# Patient Record
Sex: MALE | Race: BLACK OR AFRICAN AMERICAN | NOT HISPANIC OR LATINO
[De-identification: names, ages, dates, MRNs, and addresses within clinical notes are randomized per-mention and may not be internally consistent; named-entity substitution may affect disease eponyms.]

---

## 2019-01-29 ENCOUNTER — APPOINTMENT (OUTPATIENT)
Dept: ORTHOPEDIC SURGERY | Facility: CLINIC | Age: 59
End: 2019-01-29
Payer: COMMERCIAL

## 2019-01-29 DIAGNOSIS — S93.401A SPRAIN OF UNSPECIFIED LIGAMENT OF RIGHT ANKLE, INITIAL ENCOUNTER: ICD-10-CM

## 2019-01-29 PROCEDURE — 99214 OFFICE O/P EST MOD 30 MIN: CPT

## 2019-01-29 NOTE — HISTORY OF PRESENT ILLNESS
[de-identified] : 4yo male presents complaining of right ankle pain for 3 months. No injuries reported. A localizing superior lateral malleolus area. He feels a sharp shooting sensation. Worse with twisting ankle and ambulation. He saw his PMD who thought maybe it was gout placed him on medication. This did not help. He does not like taking medicines. Denies numbness tingling \par \par The patient's past medical history, past surgical history, medications, allergies, and social history were reviewed by me today with the patient and documented accordingly. In addition, the patient's family history, which is noncontributory to this visit, was also reviewed.\par

## 2019-01-29 NOTE — PHYSICAL EXAM
[de-identified] : General Exam\par \par Well developed, well nourished\par No apparent distress\par Oriented to person, place, and time\par Mood: Normal\par Affect: Normal\par Balance and coordination: Normal\par Gait: Normal\par \par Right ankle exam\par \par Skin: Clean, dry, intact\par Inspection: No obvious malalignment, no swelling, no effusion\par Tenderness: No tenderness over the lateral malleolus, medial malleolus, + ttp CFL/ATFL no ttp PTFL, deltoid ligament. No tenderness proximal fibula. No tenderness about heel, no pain with heel squeeze.\par ROM:dorsi flexor tendon degrees plantar flexion 40° normal subtalar motion. \par Stability: Negative anterior/posterior drawer.\par Additional tests: Negative Mortons compression test, Negative syndesmosis squeeze test.\par Strength: 5/5 TA/GS/EHL\par Sensation: Intact to light touch in dp/sp/tib/juan j/saph distributions\par Pulses: 2+ DP/PT pulses\par  [de-identified] : X-rays of the right ankle and outside were reviewed. 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 There is no fracture or dislocation. There are no loose bodies.

## 2019-01-29 NOTE — DISCUSSION/SUMMARY
[de-identified] : 58-year-old male right ankle impingement. This diagnostic options including MRI did not physically medications physical therapy injections. He requested an anti-inflammatory medication. We discussed that he retry this treatment course and he stated that he felt better we'll do it at another trial of anti-inflammatory medications. Given prescription for Mobic to take with food stop stomach upset. If not improved we'll obtain an MRI. All questions answered

## 2019-02-07 ENCOUNTER — APPOINTMENT (OUTPATIENT)
Dept: SURGERY | Facility: CLINIC | Age: 59
End: 2019-02-07
Payer: COMMERCIAL

## 2019-02-07 VITALS
BODY MASS INDEX: 23.8 KG/M2 | TEMPERATURE: 98.4 F | HEIGHT: 71 IN | HEART RATE: 68 BPM | WEIGHT: 170 LBS | DIASTOLIC BLOOD PRESSURE: 82 MMHG | SYSTOLIC BLOOD PRESSURE: 135 MMHG | OXYGEN SATURATION: 98 % | RESPIRATION RATE: 15 BRPM

## 2019-02-07 DIAGNOSIS — I10 ESSENTIAL (PRIMARY) HYPERTENSION: ICD-10-CM

## 2019-02-07 PROCEDURE — 99244 OFF/OP CNSLTJ NEW/EST MOD 40: CPT

## 2019-02-07 RX ORDER — PREDNISONE 20 MG/1
20 TABLET ORAL
Qty: 18 | Refills: 0 | Status: DISCONTINUED | COMMUNITY
Start: 2018-12-17

## 2019-02-07 RX ORDER — MELOXICAM 15 MG/1
15 TABLET ORAL
Qty: 60 | Refills: 2 | Status: DISCONTINUED | COMMUNITY
Start: 2019-01-29 | End: 2019-02-07

## 2019-02-07 RX ORDER — TRIAMCINOLONE ACETONIDE 55 UG/1
55 SOLUTION/ DROPS OPHTHALMIC
Qty: 169 | Refills: 0 | Status: DISCONTINUED | COMMUNITY
Start: 2018-03-07

## 2019-02-07 RX ORDER — INDOMETHACIN 50 MG/1
50 CAPSULE ORAL
Qty: 10 | Refills: 0 | Status: DISCONTINUED | COMMUNITY
Start: 2018-12-10

## 2019-02-07 RX ORDER — ALLOPURINOL 100 MG/1
100 TABLET ORAL
Qty: 30 | Refills: 0 | Status: DISCONTINUED | COMMUNITY
Start: 2018-11-19

## 2019-02-07 RX ORDER — ASCORBIC ACID 500 MG
TABLET ORAL
Refills: 0 | Status: ACTIVE | COMMUNITY

## 2019-02-07 NOTE — HISTORY OF PRESENT ILLNESS
[de-identified] : This 58-year-old gentleman complains of a bulge in the left groin that has been present for at least 10 years. He feels that it has gotten bigger over the last several years. He experiences moderate discomfort when he eats 2 much he has been on his feet for a long time.  He denies any change in bowel or urinary habits

## 2019-02-07 NOTE — PHYSICAL EXAM
[Normal Breath Sounds] : Normal breath sounds [Normal Heart Sounds] : normal heart sounds [Calm] : calm [de-identified] : normal [de-identified] : normal [de-identified] : normal [de-identified] : Soft and nontender. There is an easily reducible left inguinal hernia. No hernias are palpated on the right [de-identified] : Penis and testicles normal [de-identified] : normal [de-identified] : normal [de-identified] : normal

## 2019-02-07 NOTE — PLAN
[FreeTextEntry1] : This patient suffered from a left inguinal hernia. I have offered him repair.The procedure as well as risks(including, but not limited to bleeding, infection, injury to hollow viscus, chronic pain and testicular atrophy), benefits (pain relief), and alternatives were explained to the patient.

## 2019-02-14 ENCOUNTER — OUTPATIENT (OUTPATIENT)
Dept: OUTPATIENT SERVICES | Facility: HOSPITAL | Age: 59
LOS: 1 days | End: 2019-02-14
Payer: COMMERCIAL

## 2019-02-14 VITALS
HEIGHT: 71 IN | SYSTOLIC BLOOD PRESSURE: 140 MMHG | WEIGHT: 184.09 LBS | OXYGEN SATURATION: 99 % | HEART RATE: 64 BPM | TEMPERATURE: 99 F | RESPIRATION RATE: 18 BRPM | DIASTOLIC BLOOD PRESSURE: 82 MMHG

## 2019-02-14 DIAGNOSIS — Z98.890 OTHER SPECIFIED POSTPROCEDURAL STATES: Chronic | ICD-10-CM

## 2019-02-14 DIAGNOSIS — Z98.89 OTHER SPECIFIED POSTPROCEDURAL STATES: Chronic | ICD-10-CM

## 2019-02-14 DIAGNOSIS — Z01.818 ENCOUNTER FOR OTHER PREPROCEDURAL EXAMINATION: ICD-10-CM

## 2019-02-14 DIAGNOSIS — I10 ESSENTIAL (PRIMARY) HYPERTENSION: ICD-10-CM

## 2019-02-14 DIAGNOSIS — K40.90 UNILATERAL INGUINAL HERNIA, WITHOUT OBSTRUCTION OR GANGRENE, NOT SPECIFIED AS RECURRENT: ICD-10-CM

## 2019-02-14 LAB
ANION GAP SERPL CALC-SCNC: 13 MMOL/L — SIGNIFICANT CHANGE UP (ref 5–17)
BUN SERPL-MCNC: 18 MG/DL — SIGNIFICANT CHANGE UP (ref 7–23)
CALCIUM SERPL-MCNC: 8.9 MG/DL — SIGNIFICANT CHANGE UP (ref 8.4–10.5)
CHLORIDE SERPL-SCNC: 99 MMOL/L — SIGNIFICANT CHANGE UP (ref 96–108)
CO2 SERPL-SCNC: 25 MMOL/L — SIGNIFICANT CHANGE UP (ref 22–31)
CREAT SERPL-MCNC: 0.7 MG/DL — SIGNIFICANT CHANGE UP (ref 0.5–1.3)
GLUCOSE SERPL-MCNC: 110 MG/DL — HIGH (ref 70–99)
HCT VFR BLD CALC: 47.2 % — SIGNIFICANT CHANGE UP (ref 39–50)
HGB BLD-MCNC: 14.9 G/DL — SIGNIFICANT CHANGE UP (ref 13–17)
MCHC RBC-ENTMCNC: 27.6 PG — SIGNIFICANT CHANGE UP (ref 27–34)
MCHC RBC-ENTMCNC: 31.6 GM/DL — LOW (ref 32–36)
MCV RBC AUTO: 87.4 FL — SIGNIFICANT CHANGE UP (ref 80–100)
PLATELET # BLD AUTO: 246 K/UL — SIGNIFICANT CHANGE UP (ref 150–400)
POTASSIUM SERPL-MCNC: 4.4 MMOL/L — SIGNIFICANT CHANGE UP (ref 3.5–5.3)
POTASSIUM SERPL-SCNC: 4.4 MMOL/L — SIGNIFICANT CHANGE UP (ref 3.5–5.3)
RBC # BLD: 5.4 M/UL — SIGNIFICANT CHANGE UP (ref 4.2–5.8)
RBC # FLD: 12.9 % — SIGNIFICANT CHANGE UP (ref 10.3–14.5)
SODIUM SERPL-SCNC: 137 MMOL/L — SIGNIFICANT CHANGE UP (ref 135–145)
WBC # BLD: 5.63 K/UL — SIGNIFICANT CHANGE UP (ref 3.8–10.5)
WBC # FLD AUTO: 5.63 K/UL — SIGNIFICANT CHANGE UP (ref 3.8–10.5)

## 2019-02-14 PROCEDURE — 85027 COMPLETE CBC AUTOMATED: CPT

## 2019-02-14 PROCEDURE — 80048 BASIC METABOLIC PNL TOTAL CA: CPT

## 2019-02-14 PROCEDURE — G0463: CPT

## 2019-02-14 RX ORDER — LIDOCAINE HCL 20 MG/ML
0.2 VIAL (ML) INJECTION ONCE
Qty: 0 | Refills: 0 | Status: DISCONTINUED | OUTPATIENT
Start: 2019-03-19 | End: 2019-04-03

## 2019-02-14 RX ORDER — SODIUM CHLORIDE 9 MG/ML
3 INJECTION INTRAMUSCULAR; INTRAVENOUS; SUBCUTANEOUS EVERY 8 HOURS
Qty: 0 | Refills: 0 | Status: DISCONTINUED | OUTPATIENT
Start: 2019-03-19 | End: 2019-04-03

## 2019-02-14 NOTE — H&P PST ADULT - HISTORY OF PRESENT ILLNESS
58 year old male presents for left inguinal hernia repair. Pt. reports experiencing a bulge and discomfort in his left groin X many years which has increased in severity over the past few months.

## 2019-02-25 ENCOUNTER — APPOINTMENT (OUTPATIENT)
Dept: SURGERY | Facility: HOSPITAL | Age: 59
End: 2019-02-25

## 2019-03-18 ENCOUNTER — TRANSCRIPTION ENCOUNTER (OUTPATIENT)
Age: 59
End: 2019-03-18

## 2019-03-19 ENCOUNTER — OUTPATIENT (OUTPATIENT)
Dept: OUTPATIENT SERVICES | Facility: HOSPITAL | Age: 59
LOS: 1 days | End: 2019-03-19
Payer: COMMERCIAL

## 2019-03-19 ENCOUNTER — RESULT REVIEW (OUTPATIENT)
Age: 59
End: 2019-03-19

## 2019-03-19 ENCOUNTER — APPOINTMENT (OUTPATIENT)
Dept: SURGERY | Facility: HOSPITAL | Age: 59
End: 2019-03-19
Payer: COMMERCIAL

## 2019-03-19 VITALS
HEIGHT: 71 IN | HEART RATE: 64 BPM | TEMPERATURE: 99 F | RESPIRATION RATE: 18 BRPM | WEIGHT: 184.09 LBS | DIASTOLIC BLOOD PRESSURE: 82 MMHG | OXYGEN SATURATION: 100 % | SYSTOLIC BLOOD PRESSURE: 158 MMHG

## 2019-03-19 VITALS
RESPIRATION RATE: 16 BRPM | SYSTOLIC BLOOD PRESSURE: 135 MMHG | HEART RATE: 59 BPM | DIASTOLIC BLOOD PRESSURE: 79 MMHG | TEMPERATURE: 98 F | OXYGEN SATURATION: 99 %

## 2019-03-19 DIAGNOSIS — Z98.890 OTHER SPECIFIED POSTPROCEDURAL STATES: Chronic | ICD-10-CM

## 2019-03-19 DIAGNOSIS — K40.90 UNILATERAL INGUINAL HERNIA, WITHOUT OBSTRUCTION OR GANGRENE, NOT SPECIFIED AS RECURRENT: ICD-10-CM

## 2019-03-19 DIAGNOSIS — Z01.818 ENCOUNTER FOR OTHER PREPROCEDURAL EXAMINATION: ICD-10-CM

## 2019-03-19 DIAGNOSIS — Z98.89 OTHER SPECIFIED POSTPROCEDURAL STATES: Chronic | ICD-10-CM

## 2019-03-19 PROCEDURE — 88304 TISSUE EXAM BY PATHOLOGIST: CPT

## 2019-03-19 PROCEDURE — 49505 PRP I/HERN INIT REDUC >5 YR: CPT | Mod: LT

## 2019-03-19 PROCEDURE — 55520 REMOVAL OF SPERM CORD LESION: CPT | Mod: 59,LT

## 2019-03-19 PROCEDURE — 88304 TISSUE EXAM BY PATHOLOGIST: CPT | Mod: 26

## 2019-03-19 PROCEDURE — C1781: CPT

## 2019-03-19 RX ORDER — SODIUM CHLORIDE 9 MG/ML
1000 INJECTION, SOLUTION INTRAVENOUS
Qty: 0 | Refills: 0 | Status: DISCONTINUED | OUTPATIENT
Start: 2019-03-19 | End: 2019-04-03

## 2019-03-19 RX ORDER — CEFOTETAN DISODIUM 1 G
2 VIAL (EA) INJECTION ONCE
Qty: 0 | Refills: 0 | Status: COMPLETED | OUTPATIENT
Start: 2019-03-19 | End: 2019-03-19

## 2019-03-19 RX ORDER — ACETAMINOPHEN 500 MG
1000 TABLET ORAL ONCE
Qty: 0 | Refills: 0 | Status: COMPLETED | OUTPATIENT
Start: 2019-03-19 | End: 2019-03-19

## 2019-03-19 RX ORDER — ONDANSETRON 8 MG/1
4 TABLET, FILM COATED ORAL ONCE
Qty: 0 | Refills: 0 | Status: DISCONTINUED | OUTPATIENT
Start: 2019-03-19 | End: 2019-04-03

## 2019-03-19 RX ORDER — OXYCODONE HYDROCHLORIDE 5 MG/1
10 TABLET ORAL ONCE
Qty: 0 | Refills: 0 | Status: DISCONTINUED | OUTPATIENT
Start: 2019-03-19 | End: 2019-03-19

## 2019-03-19 RX ORDER — AMLODIPINE BESYLATE 2.5 MG/1
1 TABLET ORAL
Qty: 0 | Refills: 0 | COMMUNITY

## 2019-03-19 RX ORDER — OXYCODONE HYDROCHLORIDE 5 MG/1
5 TABLET ORAL ONCE
Qty: 0 | Refills: 0 | Status: DISCONTINUED | OUTPATIENT
Start: 2019-03-19 | End: 2019-03-19

## 2019-03-19 RX ORDER — CELECOXIB 200 MG/1
200 CAPSULE ORAL ONCE
Qty: 0 | Refills: 0 | Status: COMPLETED | OUTPATIENT
Start: 2019-03-19 | End: 2019-03-19

## 2019-03-19 RX ORDER — CELECOXIB 200 MG/1
200 CAPSULE ORAL ONCE
Qty: 0 | Refills: 0 | Status: DISCONTINUED | OUTPATIENT
Start: 2019-03-19 | End: 2019-04-03

## 2019-03-19 RX ORDER — MELOXICAM 15 MG/1
1 TABLET ORAL
Qty: 0 | Refills: 0 | COMMUNITY

## 2019-03-19 RX ADMIN — CELECOXIB 200 MILLIGRAM(S): 200 CAPSULE ORAL at 10:13

## 2019-03-19 RX ADMIN — Medication 1000 MILLIGRAM(S): at 10:13

## 2019-03-19 NOTE — ASU DISCHARGE PLAN (ADULT/PEDIATRIC) - FOLLOW UP APPOINTMENTS
[facility] ([facility]): 911 or go to the nearest Emergency Room

## 2019-03-19 NOTE — ASU DISCHARGE PLAN (ADULT/PEDIATRIC) - CARE PROVIDER_API CALL
Ulises Garvey)  Surgery  [address], [address]  [place] [address]  Phone: [phone]  Fax: [phone]  Follow Up Time: Routine

## 2019-03-19 NOTE — BRIEF OPERATIVE NOTE - OPERATION/FINDINGS
Hernia sac  from spermatic cord. Vas deferens identified and protected. Inguinal nerves identified and protected. Mesh placed and sutured in place over defect. Fascia and skin closed in layers.    Specimens:  1) lipoma from within hernia sac

## 2019-03-19 NOTE — BRIEF OPERATIVE NOTE - NSICDXBRIEFPROCEDURE_GEN_ALL_CORE_FT
PROCEDURES:  Repair, hernia, inguinal, open, adult 19-Mar-2019 12:47:24  Nabila Callahan PROCEDURES:  Repair, hernia, inguinal, open, adult 19-Mar-2019 12:47:24 Left inguinal hernia repair with mesh Nabila Callahan

## 2019-03-19 NOTE — ASU DISCHARGE PLAN (ADULT/PEDIATRIC) - CALL YOUR DOCTOR IF YOU HAVE ANY OF THE FOLLOWING:
Pain not relieved by Medications/Fever greater than (need to indicate Fahrenheit or Celsius)/Swelling that gets worse/Wound/Surgical Site with redness, or foul smelling discharge or pus/Unable to urinate/Bleeding that does not stop Fever greater than (need to indicate Fahrenheit or Celsius)/Inability to tolerate liquids or foods/Pain not relieved by Medications/Wound/Surgical Site with redness, or foul smelling discharge or pus/Swelling that gets worse/Unable to urinate/Nausea and vomiting that does not stop/Increased irritability or sluggishness/Numbness, tingling, color or temperature change to extremity/Bleeding that does not stop

## 2019-03-26 PROBLEM — Z87.19 HISTORY OF INGUINAL HERNIA: Status: RESOLVED | Noted: 2019-02-07 | Resolved: 2019-03-26

## 2019-03-26 LAB — SURGICAL PATHOLOGY STUDY: SIGNIFICANT CHANGE UP

## 2019-03-27 ENCOUNTER — APPOINTMENT (OUTPATIENT)
Dept: SURGERY | Facility: CLINIC | Age: 59
End: 2019-03-27
Payer: COMMERCIAL

## 2019-03-27 VITALS
TEMPERATURE: 98 F | SYSTOLIC BLOOD PRESSURE: 136 MMHG | DIASTOLIC BLOOD PRESSURE: 79 MMHG | OXYGEN SATURATION: 99 % | HEART RATE: 62 BPM | RESPIRATION RATE: 16 BRPM

## 2019-03-27 DIAGNOSIS — Z09 ENCOUNTER FOR FOLLOW-UP EXAMINATION AFTER COMPLETED TREATMENT FOR CONDITIONS OTHER THAN MALIGNANT NEOPLASM: ICD-10-CM

## 2019-03-27 DIAGNOSIS — Z87.19 PERSONAL HISTORY OF OTHER DISEASES OF THE DIGESTIVE SYSTEM: ICD-10-CM

## 2019-03-27 PROCEDURE — 99024 POSTOP FOLLOW-UP VISIT: CPT

## 2019-03-27 RX ORDER — OXYCODONE AND ACETAMINOPHEN 5; 325 MG/1; MG/1
5-325 TABLET ORAL
Qty: 10 | Refills: 0 | Status: DISCONTINUED | COMMUNITY
Start: 2019-03-19

## 2019-03-27 RX ORDER — ASPIRIN 81 MG
81 TABLET, DELAYED RELEASE (ENTERIC COATED) ORAL
Refills: 0 | Status: ACTIVE | COMMUNITY

## 2019-03-27 NOTE — REASON FOR VISIT
[Post Op: _________] : a [unfilled] post op visit [FreeTextEntry1] : S/p Repair of left inguinal hernia with mesh, and resection of left cord lipoma.\par

## 2019-03-27 NOTE — ASSESSMENT
[FreeTextEntry1] : The patient has been reassured that the soft tissue swelling and induration about the wound will be resolving in the upcoming weeks to months

## 2019-06-18 ENCOUNTER — APPOINTMENT (OUTPATIENT)
Dept: ORTHOPEDIC SURGERY | Facility: CLINIC | Age: 59
End: 2019-06-18

## 2019-06-18 DIAGNOSIS — M25.871 OTHER SPECIFIED JOINT DISORDERS, RIGHT ANKLE AND FOOT: ICD-10-CM

## 2019-06-21 ENCOUNTER — APPOINTMENT (OUTPATIENT)
Dept: ORTHOPEDIC SURGERY | Facility: CLINIC | Age: 59
End: 2019-06-21

## 2019-06-28 ENCOUNTER — APPOINTMENT (OUTPATIENT)
Dept: ORTHOPEDIC SURGERY | Facility: CLINIC | Age: 59
End: 2019-06-28

## 2019-07-31 ENCOUNTER — APPOINTMENT (OUTPATIENT)
Dept: ORTHOPEDIC SURGERY | Facility: CLINIC | Age: 59
End: 2019-07-31
Payer: COMMERCIAL

## 2019-07-31 PROBLEM — M25.871 ANKLE IMPINGEMENT SYNDROME, RIGHT: Status: ACTIVE | Noted: 2019-01-29

## 2019-07-31 PROCEDURE — 99213 OFFICE O/P EST LOW 20 MIN: CPT

## 2019-07-31 RX ORDER — GABAPENTIN 300 MG/1
300 CAPSULE ORAL
Qty: 60 | Refills: 0 | Status: ACTIVE | COMMUNITY
Start: 2019-04-02

## 2019-07-31 RX ORDER — LEVOFLOXACIN 500 MG/1
500 TABLET, FILM COATED ORAL
Qty: 7 | Refills: 0 | Status: ACTIVE | COMMUNITY
Start: 2019-04-02

## 2019-07-31 RX ORDER — AZITHROMYCIN 250 MG/1
250 TABLET, FILM COATED ORAL
Qty: 6 | Refills: 0 | Status: ACTIVE | COMMUNITY
Start: 2019-06-15

## 2019-07-31 RX ORDER — BENZONATATE 200 MG/1
200 CAPSULE ORAL
Qty: 30 | Refills: 0 | Status: ACTIVE | COMMUNITY
Start: 2019-04-02

## 2019-07-31 RX ORDER — PREDNISONE 10 MG/1
10 TABLET ORAL
Qty: 14 | Refills: 0 | Status: ACTIVE | COMMUNITY
Start: 2019-06-15

## 2019-07-31 RX ORDER — BENZONATATE 100 MG/1
100 CAPSULE ORAL
Qty: 30 | Refills: 0 | Status: ACTIVE | COMMUNITY
Start: 2019-07-29

## 2019-07-31 RX ORDER — LORATADINE 10 MG/1
10 TABLET ORAL
Qty: 30 | Refills: 0 | Status: ACTIVE | COMMUNITY
Start: 2019-06-15

## 2019-07-31 RX ORDER — ALBUTEROL SULFATE 90 UG/1
108 (90 BASE) AEROSOL, METERED RESPIRATORY (INHALATION)
Qty: 18 | Refills: 0 | Status: ACTIVE | COMMUNITY
Start: 2019-06-15

## 2019-07-31 RX ORDER — GUAIFENESIN AND PSEUDOEPHEDRINE HYDROCHLORIDE 600; 60 MG/1; MG/1
60-600 TABLET, EXTENDED RELEASE ORAL
Qty: 18 | Refills: 0 | Status: ACTIVE | COMMUNITY
Start: 2019-04-02

## 2019-07-31 NOTE — HISTORY OF PRESENT ILLNESS
[de-identified] : 57yo male presents complaining of right ankle pain for 3 months. No injuries reported. A localizing superior lateral malleolus area. He feels a sharp shooting sensation. Worse with twisting ankle and ambulation. He saw his PMD who thought maybe it was gout placed him on medication. This did not help. He does not like taking medicines. Denies numbness tingling '\par \par since his last visit he has missed several appts. pain unchanged

## 2019-07-31 NOTE — PHYSICAL EXAM
[de-identified] : Right ankle exam\par \par Skin: Clean, dry, intact\par Inspection: No obvious malalignment, no swelling, no effusion\par Tenderness: No tenderness over the lateral malleolus, medial malleolus, + ttp CFL/ATFL no ttp PTFL, deltoid ligament. No tenderness proximal fibula. No tenderness about heel, no pain with heel squeeze.\par ROM:dorsi flexor tendon degrees plantar flexion 40° normal subtalar motion. \par Stability: Negative anterior/posterior drawer.\par Additional tests: Negative Mortons compression test, Negative syndesmosis squeeze test.\par Strength: 5/5 TA/GS/EHL\par Sensation: Intact to light touch in dp/sp/tib/juan j/saph distributions\par Pulses: 2+ DP/PT pulses\par
